# Patient Record
Sex: MALE | Race: WHITE | NOT HISPANIC OR LATINO | URBAN - METROPOLITAN AREA
[De-identification: names, ages, dates, MRNs, and addresses within clinical notes are randomized per-mention and may not be internally consistent; named-entity substitution may affect disease eponyms.]

---

## 2018-09-21 ENCOUNTER — OFFICE VISIT - HEALTHEAST (OUTPATIENT)
Dept: FAMILY MEDICINE | Facility: CLINIC | Age: 58
End: 2018-09-21

## 2018-09-21 DIAGNOSIS — F32.0 MILD MAJOR DEPRESSION (H): ICD-10-CM

## 2018-09-21 DIAGNOSIS — Z00.00 HEALTH CARE MAINTENANCE: ICD-10-CM

## 2018-09-21 DIAGNOSIS — Z12.11 SPECIAL SCREENING FOR MALIGNANT NEOPLASMS, COLON: ICD-10-CM

## 2018-09-21 LAB
ALBUMIN SERPL-MCNC: 4.5 G/DL (ref 3.5–5)
ALP SERPL-CCNC: 63 U/L (ref 45–120)
ALT SERPL W P-5'-P-CCNC: 27 U/L (ref 0–45)
ANION GAP SERPL CALCULATED.3IONS-SCNC: 10 MMOL/L (ref 5–18)
AST SERPL W P-5'-P-CCNC: 31 U/L (ref 0–40)
BILIRUB SERPL-MCNC: 0.4 MG/DL (ref 0–1)
BUN SERPL-MCNC: 19 MG/DL (ref 8–22)
CALCIUM SERPL-MCNC: 9.6 MG/DL (ref 8.5–10.5)
CHLORIDE BLD-SCNC: 104 MMOL/L (ref 98–107)
CHOLEST SERPL-MCNC: 217 MG/DL
CO2 SERPL-SCNC: 24 MMOL/L (ref 22–31)
CREAT SERPL-MCNC: 0.83 MG/DL (ref 0.7–1.3)
ERYTHROCYTE [DISTWIDTH] IN BLOOD BY AUTOMATED COUNT: 12.1 % (ref 11–14.5)
FASTING STATUS PATIENT QL REPORTED: ABNORMAL
GFR SERPL CREATININE-BSD FRML MDRD: >60 ML/MIN/1.73M2
GLUCOSE BLD-MCNC: 75 MG/DL (ref 70–125)
HCT VFR BLD AUTO: 42.3 % (ref 40–54)
HDLC SERPL-MCNC: 50 MG/DL
HGB BLD-MCNC: 14.2 G/DL (ref 14–18)
LDLC SERPL CALC-MCNC: 148 MG/DL
MCH RBC QN AUTO: 30.8 PG (ref 27–34)
MCHC RBC AUTO-ENTMCNC: 33.4 G/DL (ref 32–36)
MCV RBC AUTO: 92 FL (ref 80–100)
PLATELET # BLD AUTO: 290 THOU/UL (ref 140–440)
PMV BLD AUTO: 8.1 FL (ref 7–10)
POTASSIUM BLD-SCNC: 4.1 MMOL/L (ref 3.5–5)
PROT SERPL-MCNC: 6.8 G/DL (ref 6–8)
PSA SERPL-MCNC: 0.9 NG/ML (ref 0–3.5)
RBC # BLD AUTO: 4.6 MILL/UL (ref 4.4–6.2)
SODIUM SERPL-SCNC: 138 MMOL/L (ref 136–145)
TRIGL SERPL-MCNC: 97 MG/DL
WBC: 8.3 THOU/UL (ref 4–11)

## 2018-09-21 ASSESSMENT — MIFFLIN-ST. JEOR: SCORE: 1540.87

## 2018-09-25 ENCOUNTER — RECORDS - HEALTHEAST (OUTPATIENT)
Dept: ADMINISTRATIVE | Facility: OTHER | Age: 58
End: 2018-09-25

## 2018-10-24 ENCOUNTER — AMBULATORY - HEALTHEAST (OUTPATIENT)
Dept: FAMILY MEDICINE | Facility: CLINIC | Age: 58
End: 2018-10-24

## 2018-10-24 ENCOUNTER — COMMUNICATION - HEALTHEAST (OUTPATIENT)
Dept: FAMILY MEDICINE | Facility: CLINIC | Age: 58
End: 2018-10-24

## 2018-10-25 ENCOUNTER — AMBULATORY - HEALTHEAST (OUTPATIENT)
Dept: FAMILY MEDICINE | Facility: CLINIC | Age: 58
End: 2018-10-25

## 2018-10-25 ENCOUNTER — COMMUNICATION - HEALTHEAST (OUTPATIENT)
Dept: FAMILY MEDICINE | Facility: CLINIC | Age: 58
End: 2018-10-25

## 2018-10-25 ENCOUNTER — RECORDS - HEALTHEAST (OUTPATIENT)
Dept: ADMINISTRATIVE | Facility: OTHER | Age: 58
End: 2018-10-25

## 2018-10-26 ENCOUNTER — AMBULATORY - HEALTHEAST (OUTPATIENT)
Dept: FAMILY MEDICINE | Facility: CLINIC | Age: 58
End: 2018-10-26

## 2018-11-08 ENCOUNTER — COMMUNICATION - HEALTHEAST (OUTPATIENT)
Dept: FAMILY MEDICINE | Facility: CLINIC | Age: 58
End: 2018-11-08

## 2018-11-16 ENCOUNTER — COMMUNICATION - HEALTHEAST (OUTPATIENT)
Dept: FAMILY MEDICINE | Facility: CLINIC | Age: 58
End: 2018-11-16

## 2019-07-09 ENCOUNTER — AMBULATORY - HEALTHEAST (OUTPATIENT)
Dept: FAMILY MEDICINE | Facility: CLINIC | Age: 59
End: 2019-07-09

## 2019-07-09 ENCOUNTER — COMMUNICATION - HEALTHEAST (OUTPATIENT)
Dept: FAMILY MEDICINE | Facility: CLINIC | Age: 59
End: 2019-07-09

## 2019-07-09 DIAGNOSIS — F33.41 RECURRENT MAJOR DEPRESSIVE DISORDER, IN PARTIAL REMISSION (H): ICD-10-CM

## 2019-08-23 ENCOUNTER — OFFICE VISIT - HEALTHEAST (OUTPATIENT)
Dept: FAMILY MEDICINE | Facility: CLINIC | Age: 59
End: 2019-08-23

## 2019-08-23 DIAGNOSIS — F33.41 RECURRENT MAJOR DEPRESSIVE DISORDER, IN PARTIAL REMISSION (H): ICD-10-CM

## 2019-08-23 DIAGNOSIS — Z00.00 HEALTH CARE MAINTENANCE: ICD-10-CM

## 2019-08-23 DIAGNOSIS — R68.82 DECREASED LIBIDO: ICD-10-CM

## 2019-08-23 RX ORDER — PAROXETINE 10 MG/1
10 TABLET, FILM COATED ORAL DAILY
Qty: 90 TABLET | Refills: 3 | Status: SHIPPED | OUTPATIENT
Start: 2019-08-23

## 2019-08-23 RX ORDER — SILDENAFIL CITRATE 20 MG/1
20 TABLET ORAL DAILY PRN
Qty: 30 TABLET | Refills: 0 | Status: SHIPPED | OUTPATIENT
Start: 2019-08-23

## 2019-08-23 ASSESSMENT — MIFFLIN-ST. JEOR: SCORE: 1584.42

## 2020-07-06 ENCOUNTER — COMMUNICATION - HEALTHEAST (OUTPATIENT)
Dept: FAMILY MEDICINE | Facility: CLINIC | Age: 60
End: 2020-07-06

## 2020-07-16 ENCOUNTER — OFFICE VISIT - HEALTHEAST (OUTPATIENT)
Dept: FAMILY MEDICINE | Facility: CLINIC | Age: 60
End: 2020-07-16

## 2020-07-16 DIAGNOSIS — M25.562 LEFT KNEE PAIN, UNSPECIFIED CHRONICITY: ICD-10-CM

## 2020-07-16 DIAGNOSIS — F33.41 RECURRENT MAJOR DEPRESSIVE DISORDER, IN PARTIAL REMISSION (H): ICD-10-CM

## 2020-07-16 DIAGNOSIS — F17.200 TOBACCO USE DISORDER: ICD-10-CM

## 2020-07-16 ASSESSMENT — ANXIETY QUESTIONNAIRES
7. FEELING AFRAID AS IF SOMETHING AWFUL MIGHT HAPPEN: NOT AT ALL
IF YOU CHECKED OFF ANY PROBLEMS ON THIS QUESTIONNAIRE, HOW DIFFICULT HAVE THESE PROBLEMS MADE IT FOR YOU TO DO YOUR WORK, TAKE CARE OF THINGS AT HOME, OR GET ALONG WITH OTHER PEOPLE: SOMEWHAT DIFFICULT
5. BEING SO RESTLESS THAT IT IS HARD TO SIT STILL: MORE THAN HALF THE DAYS
4. TROUBLE RELAXING: SEVERAL DAYS
1. FEELING NERVOUS, ANXIOUS, OR ON EDGE: SEVERAL DAYS
2. NOT BEING ABLE TO STOP OR CONTROL WORRYING: SEVERAL DAYS
GAD7 TOTAL SCORE: 7
6. BECOMING EASILY ANNOYED OR IRRITABLE: MORE THAN HALF THE DAYS
3. WORRYING TOO MUCH ABOUT DIFFERENT THINGS: NOT AT ALL

## 2020-07-16 ASSESSMENT — PATIENT HEALTH QUESTIONNAIRE - PHQ9: SUM OF ALL RESPONSES TO PHQ QUESTIONS 1-9: 5

## 2020-07-16 ASSESSMENT — MIFFLIN-ST. JEOR: SCORE: 1574.84

## 2021-05-26 ASSESSMENT — PATIENT HEALTH QUESTIONNAIRE - PHQ9: SUM OF ALL RESPONSES TO PHQ QUESTIONS 1-9: 5

## 2021-05-28 ASSESSMENT — ANXIETY QUESTIONNAIRES: GAD7 TOTAL SCORE: 7

## 2021-05-30 ENCOUNTER — RECORDS - HEALTHEAST (OUTPATIENT)
Dept: ADMINISTRATIVE | Facility: CLINIC | Age: 61
End: 2021-05-30

## 2021-05-31 NOTE — PROGRESS NOTES
ASSESSMENT/PLAN  1. Recurrent major depressive disorder, in partial remission (H)  Patient is here for medication refills  Patient feels current medication is working well  Patient has experienced some decreased libido which we discussed could be secondary to the medication-he feels medication is working well enough that he does not wish to try an alternative at this time we will keep in mind  - PARoxetine (PAXIL) 10 MG tablet; Take 1 tablet (10 mg total) by mouth daily.  Dispense: 90 tablet; Refill: 3    2. Health care maintenance  Patient is due for annual exam and fasting labs  He will return fasting in the near future and we can follow-up based on laboratory results  Reviewed last year his previous labs with him  - PSA, Annual Screen (Prostatic-Specific Antigen); Future  - Comprehensive Metabolic Panel; Future  - HM2(CBC w/o Differential); Future  - Lipid Blount FASTING; Future    3. Decreased libido  Patient has noted some decreased libido and periodic erectile dysfunction  Discussed medication options  Discussed how could be secondary to side effect of his SSRI  Patient like to do a trial on Revatio-prescription printed  Discussed possible side effects  - sildenafil (REVATIO) 20 mg tablet; Take 1 tablet (20 mg total) by mouth daily as needed.  Dispense: 30 tablet; Refill: 0        SUBJECTIVE:   Chief Complaint   Patient presents with     Medication Management     Lori Rios 58 y.o. male    Current Outpatient Medications   Medication Sig Dispense Refill     PARoxetine (PAXIL) 10 MG tablet Take 1 tablet (10 mg total) by mouth daily. 90 tablet 3     sildenafil (REVATIO) 20 mg tablet Take 1 tablet (20 mg total) by mouth daily as needed. 30 tablet 0     No current facility-administered medications for this visit.      Allergies: Patient has no known allergies.   No LMP for male patient.    HPI:   Patient is here for medication refills-patient has a history of some depression and was started on  "paroxetine  Has done well with the medication and would like refill sent to his pharmacy  He does discuss these had some decreased libido and periodic erectile dysfunction  Discussed how this could be a side effect from his Paxil and we discussed possibly trying an alternative medication  Discussed ED medications  His Paxil is working well and is hesitant about changing this at this time-we did discuss the possibility of sildenafil and discussed the possible side effects  He will do a trial of this medication to see if he can improve his symptoms  Prescription printed    Discussed and reviewed patient's previous laboratory results from last year-discussed how he is due for blood work in the near future-he will return fasting and will obtain fasting blood work    ROS: negative except as per HPI    OBJECTIVE:   The patient appears well, alert, oriented x 3, in no distress.  /67 (Patient Site: Left Arm, Patient Position: Sitting, Cuff Size: Adult Regular)   Pulse 70   Temp 97.4  F (36.3  C) (Oral)   Resp 16   Ht 5' 10.5\" (1.791 m)   Wt 167 lb 9.6 oz (76 kg)   BMI 23.71 kg/m      Lungs: clear, good air entry, no wheezes, rhonchi or rales.   Cardiac: S1 and S2 normal, no murmurs, regular rate and rhythm.   Abdomen: normal bowel sounds, soft  Extremities: show no edema, normal peripheral pulses.   Neurological: normal, no focal findings.  Skin: clear, dry, no rashes/lesions  Psych- normal mood and affect      Pt states an understanding and agrees to the above plan.    "

## 2021-06-01 ENCOUNTER — RECORDS - HEALTHEAST (OUTPATIENT)
Dept: ADMINISTRATIVE | Facility: CLINIC | Age: 61
End: 2021-06-01

## 2021-06-02 ENCOUNTER — RECORDS - HEALTHEAST (OUTPATIENT)
Dept: ADMINISTRATIVE | Facility: CLINIC | Age: 61
End: 2021-06-02

## 2021-06-02 VITALS — WEIGHT: 158 LBS | HEIGHT: 71 IN | BODY MASS INDEX: 22.12 KG/M2

## 2021-06-03 VITALS — BODY MASS INDEX: 23.46 KG/M2 | WEIGHT: 167.6 LBS | HEIGHT: 71 IN

## 2021-06-04 VITALS
BODY MASS INDEX: 23.77 KG/M2 | OXYGEN SATURATION: 96 % | HEART RATE: 79 BPM | DIASTOLIC BLOOD PRESSURE: 77 MMHG | RESPIRATION RATE: 16 BRPM | HEIGHT: 70 IN | SYSTOLIC BLOOD PRESSURE: 116 MMHG | WEIGHT: 166 LBS

## 2021-06-09 NOTE — PROGRESS NOTES
"   Assessment:     1. Left knee pain, unspecified chronicity     2. Nicotine Dependence     3. Recurrent major depressive disorder, in partial remission (H)       Discussed that left knee pain with swelling is likely past anserine bursitis.  Literature shared with the patient.  Rice therapy recommended.  Referral to orthopedics if not improving or worsening.  Patient verbalizes understanding.  About 3 to 5 minutes spent counseling to quit smoking.  Reviewed his ASCVD score and that is high despite his good cholesterol numbers secondary to smoking being a risk factor.    Best practice advisory mentions recurrent major depression.  His PHQ was reviewed and there are no concerns noted at this time.    Noted patient has pending labs that he never got done for health maintenance.  Advised follow-up for physical.     Plan:      The diagnosis was discussed with the patient and evaluation and treatment plans outlined.  Follow up: Return in about 4 weeks (around 8/13/2020) for Annual physical.     Subjective:      Brandon Rios is a  male who presents for evaluation of   Chief Complaint   Patient presents with     Knee Pain     Pt here today to evaluate LT knee pain/ swelling, Pt states it\"pops and cracks\" no known injury x 3-4 wks     Patient is here for left knee pain and swelling that started about 3 to 4 weeks ago.  This was after he had done doing a ceiling.  His usual practice was to climb up on a ladder to the ceiling then come down and jump the last step.  This he did multiple times and started noticing some swelling on the left knee on the medial side.  He has had similar in the right knee couple of years ago and was told it was a bursa.  He denies any fevers.  He denies any other joint swelling.  He does continue to smoke.    The following portions of the patient's history were reviewed and updated as appropriate: allergies, current medications, past family history, past medical history, past social history, past " "surgical history and problem list.    Review of Systems  Constitutional: negative  Respiratory: negative  Cardiovascular: negative       Objective:   /77 (Patient Site: Left Arm, Patient Position: Sitting, Cuff Size: Adult Regular)   Pulse 79   Resp 16   Ht 5' 10.35\" (1.787 m)   Wt 166 lb (75.3 kg)   SpO2 96%   BMI 23.58 kg/m      GENERAL: Healthy, alert and no distress  EYES: Eyes grossly normal to inspection. No discharge or erythema, or obvious scleral/conjunctival abnormalities.  RESP: No audible wheeze, cough, or visible cyanosis.  No visible retractions or increased work of breathing.    MS: Examination of the left knee shows no obvious deformities of the knee joint.  Joint tenderness is not present.  Range of motion is intact.  Anterior cruciate ligament and medial and lateral ligament stability is intact.  Noted small swelling that is better visualized with flexion on the insertion of hamstring.  This is mildly tender.  NEURO: Cranial nerves grossly intact. Mentation and speech appropriate for age.  PSYCH: Mentation appears normal, affect normal/bright, judgement and insight intact, normal speech and appearance well-groomed      Little interest or pleasure in doing things: Several days  Feeling down, depressed, or hopeless: Several days  Trouble falling or staying asleep, or sleeping too much: Several days  Feeling tired or having little energy: Several days  Poor appetite or overeating: Not at all  Feeling bad about yourself - or that you are a failure or have let yourself or your family down: Not at all  Trouble concentrating on things, such as reading the newspaper or watching television: Several days  Moving or speaking so slowly that other people could have noticed. Or the opposite - being so fidgety or restless that you have been moving around a lot more than usual: Not at all  Thoughts that you would be better off dead, or of hurting yourself in some way: Not at all  PHQ-9 Total Score: " 5  If you checked off any problems, how difficult have these problems made it for you to do your work, take care of things at home, or get along with other people?: Not difficult at all

## 2021-06-09 NOTE — TELEPHONE ENCOUNTER
Please help patient make visit with one of the providers for evaluation- face to face would be preferred.  Dr. MEZA

## 2021-06-09 NOTE — TELEPHONE ENCOUNTER
Pt was seen in 2016 for Bursitis in right knee treated with prednisone.     Can/should pt do a VV with you this week or add him to another providers schedule for F2F visit?

## 2021-06-16 PROBLEM — F33.41 RECURRENT MAJOR DEPRESSIVE DISORDER, IN PARTIAL REMISSION (H): Status: ACTIVE | Noted: 2020-07-16

## 2021-06-16 PROBLEM — Z00.00 HEALTHCARE MAINTENANCE: Status: ACTIVE | Noted: 2018-11-08

## 2021-06-18 NOTE — PATIENT INSTRUCTIONS - HE
Patient Instructions by Sandeep Gilbert MD at 7/16/2020  3:00 PM     Author: Sandeep Gilbert MD Service: -- Author Type: Physician    Filed: 7/16/2020  3:40 PM Encounter Date: 7/16/2020 Status: Signed    : Sandeep Gilbert MD (Physician)       Patient Education     Understanding Pes Anserine Bursitis    A bursa is a thin, slippery, sac-like film that contains a small amount of fluid. A bursa is found between bones and soft tissues in and around joints. It cushions and protects joint structures and stops them from rubbing against each other. If a bursa becomes inflamed and irritated, it's known as bursitis.  The pes anserine bursa is found on the inside of the knee joint. It lies between the shinbone (tibia) and 3 tendons that attach the hamstring muscles to the shinbone. Inflammation of this bursa is called pes anserine bursitis.  Causes of pes anserine bursitis  These may include:    Overuse of the knee during running or other sports    Sports that require repeated side-to-side motions, such as in tennis or soccer    Tight hamstrings    Incorrect technique. This includes not stretching before running, running uphill too much, or suddenly increasing miles run    Being overweight    Having knee arthritis    Having MCL (medial collateral ligament) injury  Symptoms of pes anserine bursitis  The knee joint may be painful. This pain may be worse with kneeling, going up or down stairs, or getting up from a chair. The pain often gets better with rest. The inner side of the joint may be swollen. It may also be tender and feel warm to the touch.  Treatment for pes anserine bursitis  Treatments may include:    Resting the knee. This lessens irritation and gives the bursa time to heal.    Sleeping with a cushion between the thighs. This limits pressure on the bursa.    Prescription or over-the-counter medicines. These help reduce inflammation, swelling, and pain. NSAIDs (nonsteroidal anti-inflammatory drugs) are  the most common medicines used. Medicines may be prescribed or bought over the counter. They may be given as pills. Or they may be put on the skin as a gel, cream, or patch.    A weight-loss plan if you are overweight. This relieves pressure on the knee joint.    Stretching and strengthening exercises. These help improve the strength and flexibility of the muscles around the knee.    Cold therapy, such as using ice packs. This helps reduce swelling and pain.    Physical therapy. This may include exercises or ultrasound.    Injections of medicine into the bursa. These may help relieve symptoms. The medicine is usually a corticosteroid. This is a strong anti-inflammatory medicine.  For symptoms that dont get better with these treatments, your healthcare provider may recommend surgery to remove the bursa.  Possible complications  If you dont give your knee time to heal, symptoms may return or get worse. Follow your healthcare providers instructions on resting and treating your knee.  When to call your healthcare provider  Call your healthcare provider if:    Symptoms dont get better or get worse    New symptoms develop    Fever, chills, or drainage occurs  Date Last Reviewed: 3/10/2016    6935-2802 [employer]. [address] [place] [id number]. All rights reserved. This information is not intended as a substitute for professional medical care. Always follow your healthcare professional's instructions.

## 2021-06-20 NOTE — PROGRESS NOTES
Assessment:      Healthy male exam.    [facility]  Major Depression  Plan:       All questions answered.   [facility]-will update with tetanus today, get fasting labs and f/u based on results  Major Depression-pt has been dealing with increasing symptoms of depression over the last year and we discussed medication options- will start sertraline and contact me with any problems- otherwise update in one month with change of symptoms with sertraline- will start 25mg for one week then increase to 50mg there after  Subjective:      Brandon Rios is a 57 y.o. male who presents for an annual exam. The patient reports that there is not domestic violence in his life. Pt is here for yearly exam.  He is fasting and interested in fasting labs.  He is UTD with colon cancer screening.  Pt has been having trouble with increasing depression over the last year- he has had numerous family members pass on both him and his wifes side of the family- this has started a lot of his depression but now it is something that is affecting him daily and affecting his sleep- he has noted difficultly with concentration and lack of interest in most things- his temper is shorter and he overall feels like he has more bad days than good- he would like to get started on medication- we discussed options with him today.  Have elected to get started on sertraline.  Discussed possible side effects with him today.  Pt is exercising on a regular basis.  Discussed new shingles vaccine with him today    Healthy Habits:   Regular Exercise: Yes  Sunscreen Use: Yes  Healthy Diet: Yes  Dental Visits Regularly: Yes  Seat Belt: Yes  Sexually active: Yes  Colonoscopy: Yes  Lipid Profile: Yes  Glucose Screen: Yes      Immunization History   Administered Date(s) Administered     DT (pediatric) 01/01/2001     Td, adult adsorbed, PF 09/21/2018     Td,adult,historic,unspecified 11/26/2008     Tdap 11/26/2008     Immunization status: up to date and documented.    No exam data  "present    Current Outpatient Prescriptions   Medication Sig Dispense Refill     ferrous sulfate 325 (65 FE) MG tablet Take 1 tablet by mouth daily with breakfast.       multivit-min/folic/vit K/lycop (MEN'S MULTIVITAMIN ORAL) Take by mouth.       omega-3/dha/epa/fish oil (FISH OIL-OMEGA-3 FATTY ACIDS) 300-1,000 mg capsule Take 2 g by mouth daily.       sertraline (ZOLOFT) 50 MG tablet Take 1 tablet (50 mg total) by mouth daily. 30 tablet 2     No current facility-administered medications for this visit.      No past medical history on file.  No past surgical history on file.  Review of patient's allergies indicates no known allergies.  No family history on file.  Social History     Social History     Marital status: Single     Spouse name: N/A     Number of children: N/A     Years of education: N/A     Occupational History     Not on file.     Social History Main Topics     Smoking status: Current Every Day Smoker     Packs/day: 1.00     Types: Cigarettes     Smokeless tobacco: Current User     Types: Chew     Alcohol use Yes      Comment: very little     Drug use: No     Sexual activity: Not on file     Other Topics Concern     Not on file     Social History Narrative       Review of Systems  General:  Denies problem  Eyes: Denies problem  Ears/Nose/Throat: Denies problem  Cardiovascular: Denies problem  Respiratory:  Denies problem  Gastrointestinal:  Denies problem  Genitourinary: Denies problem  Musculoskeletal:  Denies problem  Skin: Denies problem  Neurologic: Denies problem  Psychiatric: Denies problem  Endocrine: Denies problem  Heme/Lymphatic: Denies problem   Allergic/Immunologic: Denies problem        Objective:     Vitals:    09/21/18 1525   BP: 104/66   Pulse: 67   Resp: 16   Temp: 97.6  F (36.4  C)   TempSrc: Oral   Weight: 158 lb (71.7 kg)   Height: 5' 10.5\" (1.791 m)     Body mass index is 22.35 kg/(m^2).    Physical  General Appearance: Alert, cooperative, no distress, appears stated age  Head: " Normocephalic, without obvious abnormality, atraumatic  Eyes: PERRL, conjunctiva/corneas clear, EOM's intact  Ears: Normal TM's and external ear canals, both ears  Nose: Nares normal, septum midline,mucosa normal, no drainage  Throat: Lips, mucosa, and tongue normal; teeth and gums normal  Neck: Supple, symmetrical, trachea midline, no adenopathy;  thyroid: not enlarged, symmetric, no tenderness/mass/nodules; no carotid bruit or JVD  Back: Symmetric, no curvature, ROM normal, no CVA tenderness  Lungs: Clear to auscultation bilaterally, respirations unlabored  Heart: Regular rate and rhythm, S1 and S2 normal, no murmur, rub, or gallop,  Abdomen: Soft, non-tender, bowel sounds active all four quadrants,  no masses, no organomegaly  Genitourinary: deferred, no concerns  Musculoskeletal: Normal range of motion. No joint swelling or deformity.   Extremities: Extremities normal, atraumatic, no cyanosis or edema  Skin: Skin color, texture, turgor normal, no rashes or lesions  Lymph nodes: Cervical, supraclavicular, and axillary nodes normal  Neurologic: He is alert. He has normal reflexes.   Psychiatric: He has a normal mood and affect. Tearful when discussing family members who passed.

## 2021-06-26 ENCOUNTER — HEALTH MAINTENANCE LETTER (OUTPATIENT)
Age: 61
End: 2021-06-26

## 2021-10-16 ENCOUNTER — HEALTH MAINTENANCE LETTER (OUTPATIENT)
Age: 61
End: 2021-10-16

## 2022-07-23 ENCOUNTER — HEALTH MAINTENANCE LETTER (OUTPATIENT)
Age: 62
End: 2022-07-23

## 2022-10-01 ENCOUNTER — HEALTH MAINTENANCE LETTER (OUTPATIENT)
Age: 62
End: 2022-10-01

## 2023-08-06 ENCOUNTER — HEALTH MAINTENANCE LETTER (OUTPATIENT)
Age: 63
End: 2023-08-06